# Patient Record
(demographics unavailable — no encounter records)

---

## 2025-03-21 NOTE — HISTORY OF PRESENT ILLNESS
[FreeTextEntry1] : Mr. Melvin is a very pleasant 53 year old man here today for ED, screening for prostate cancer patient tried tadafil 3x and it did not work well for him.  Last seen over 1 year ago. Denies any hematuria, dysuria or pain with intercourse. Denies any personal or family history of prostate cancer. Reports occasional cigar smoking.  Reports that his diabetes is somewhat uncontrolled. Last PSA 0.67 11/2023

## 2025-03-21 NOTE — ASSESSMENT
[FreeTextEntry1] : Mr. Melvin is a very pleasant 53 year old man here today for ED, screening for prostate cancer -We had an extensive discussion regarding possible management options for erectile dysfunction, including PDE 5 inhibitors, ROSCOE, ICI, intraurethral alprostadil, penile prosthesis -After a thorough discussion of the risks and benefits of the aforementioned options he would like to try a trial of another PDE 5 inhibitor -We discussed the relative efficacy of Viagra versus Cialis -Trial of sildenafil 100 mg -I discussed the risks, benefits, alternatives, and possible side effects of Viagra (sildenafil) therapy with the patient, including but not limited to headache, flushing, upset stomach, blurry vision, change in color vision, vision loss, and priapism with the patient. -PSA -A1c -BMP -Follow-up in 1 month  Patient is being seen today for evaluation and management of a chronic and longitudinal ongoing condition and I am the primary treating physician
[FreeTextEntry1] : Thyroid ultrasound March 18, 2022\par Single right nodule 6 mm x 5 mm x 8 mm\par \par Bone mineral density: 03/18/2022\par indication: Compared to 2021 assess response to medication\par spine -2.1 osteopenia no significant change\par total hip -1.6 osteopenia no significant change\par femoral neck -2.0 osteopenia no significant change\par proximal radius -0.4 normal no significant change\par \par Thyroid US - 03/18/2021\par Right: nodule 6 mm x 5 mm x 9 mm\par \par Bone mineral density: 03/18/2021 \par Indication: vs. 2020 prior test showed bone loss\par Spine: -2.2 osteopenia, -3.8%\par Total hip: -1.7 osteopenia, no significant change\par Femoral neck: -2.0 osteopenia, no significant change\par Proximal radius: -0.2 normal, -3.1%\par \par Thyroid US - 01/27/2020\par Right: 7 x 5 x 9 mm nodule, some heterogeneity bilaterally\par \par Bone mineral density: 01/27/2020 \par Indication: vs. 2018 prior test showed progressive bone loss \par Spine: -2.0 osteopenia, -5.4%\par Total hip: -1.6 osteopenia, no significant change, decreased vs. 2 years ago\par Femoral neck: -2.0 osteopenia, no significant change\par Proximal radius: +0.1 normal -4.4%\par \par Bone mineral density: 11/06/2018 \par Indication: vs. 2016, 2 year comparison\par Spine: -1.5 osteopenia (-3.7%)\par Total hip: -1.5 osteopenia (-4.4%)\par Femoral neck: -2.0 osteopenia (-5.0%)\par Proximal radius: 0.7 normal, no significant change \par \par Bone mineral density November 29, 2016\par Two-year Comparison\par Spine -1.2, osteopenia, no significant change\par Total hip - 1.2, osteopenia, no significant change\par Femoral neck -1.7, osteopenia, no significant change\par Proximal radius 0.8, normal, no significant change\par \par bone mineral density test performed September 16, 2014\par Spine -1.1, osteopenia, stable\par Total hp -1.0, normal\par Femoral neck -1.6, osteopenia, stable\par Proximal radius 0.9, normal\par